# Patient Record
Sex: MALE | NOT HISPANIC OR LATINO | URBAN - METROPOLITAN AREA
[De-identification: names, ages, dates, MRNs, and addresses within clinical notes are randomized per-mention and may not be internally consistent; named-entity substitution may affect disease eponyms.]

---

## 2020-07-19 ENCOUNTER — EMERGENCY (EMERGENCY)
Facility: HOSPITAL | Age: 30
LOS: 1 days | Discharge: HOME | End: 2020-07-19
Admitting: EMERGENCY MEDICINE
Payer: MEDICAID

## 2020-07-19 VITALS
OXYGEN SATURATION: 100 % | SYSTOLIC BLOOD PRESSURE: 120 MMHG | TEMPERATURE: 98 F | RESPIRATION RATE: 16 BRPM | DIASTOLIC BLOOD PRESSURE: 82 MMHG | HEART RATE: 90 BPM

## 2020-07-19 PROCEDURE — 73564 X-RAY EXAM KNEE 4 OR MORE: CPT | Mod: 26,LT

## 2020-07-19 PROCEDURE — 99283 EMERGENCY DEPT VISIT LOW MDM: CPT

## 2020-07-19 RX ORDER — IBUPROFEN 200 MG
600 TABLET ORAL ONCE
Refills: 0 | Status: COMPLETED | OUTPATIENT
Start: 2020-07-19 | End: 2020-07-19

## 2020-07-19 RX ADMIN — Medication 600 MILLIGRAM(S): at 22:50

## 2020-07-19 NOTE — ED PROVIDER NOTE - PHYSICAL EXAMINATION
Vital signs reviewed.   CONSTITUTIONAL: Well-appearing; well-nourished; in no apparent distress. Non-toxic appearing.   HEAD: Normocephalic, atraumatic.  EYES: PERRL, EOM intact, conjunctiva and sclera WNL.  ENT: normal nose; no rhinorrhea;   NECK/LYMPH: Supple; non-tender;  CARD: Normal S1, S2; no murmurs, rubs, or gallops noted.  RESP: Normal chest excursion with respiration; breath sounds clear and equal bilaterally; no wheezes, rhonchi, or rales.  EXT/MS: moves all extremities; distal pulses are normal, no pedal edema. +TTP noted to medial aspect of Lt knee, +swelling noted. No crepitus. Compartments soft. Mild medial laxity. Normal cap refill. FROM of Lt knee.   SKIN: Normal for age and race; warm; dry; good turgor; no apparent lesions or exudate noted. +Swelling noted Lt knee medially.   NEURO: Awake, alert, oriented x 3, no gross deficits, no motor or sensory deficit noted.  PSYCH: Normal mood; appropriate affect.

## 2020-07-19 NOTE — ED PROVIDER NOTE - PROGRESS NOTE DETAILS
KIMBERLY Augusteill- Results of xray +for small effusion, Plan for knee brace and dc home with RICE instructions and ortho follow up. Pt understood and agreed with plan. All questions and concerns addressed. Strict return instructions given.

## 2020-07-19 NOTE — ED PROVIDER NOTE - OBJECTIVE STATEMENT
HPI- Pt is a 30 y.o male with no known PMHx who presents to ED c/o Lt knee pain s/p injury playing basketball. As per patient states that while playing basketball he went up to jump and came down on Lt leg and when he landed states his Lt knee everted and states he felt a pop and fell to ground. Pt states pain mostly to medial aspect of Lt knee. Pain exacerbated with ambulating and states knee feels like its buckling. Pt denies hitting head or loc. Denies cp, sob, weakness, numbness/tingling, back pain, neck pain or any other injuries or complaints. No decreased rom.  +swelling.

## 2020-07-19 NOTE — ED PROVIDER NOTE - NSFOLLOWUPINSTRUCTIONS_ED_ALL_ED_FT
Patient advised to follow up with PRIMARY CARE DOCTOR 1-2 DAYS AND ORTHOPEDIST WITHIN WEEK   and told to return to the emergency department immediately for any new or concerning symptoms such OR ANY OTHER COMPLAINTS. Patient agrees with plan.      -Take motrin or tylenol as needed for pain   -Non-weight bearing as tolerated  -Avoid strenuous activity or sports/gym until follow up with orthopedist      Continue all previously prescribed medications as directed unless otherwise instructed.  Follow up with your primary care physician in 48-72 hours- bring copies of your results.  Return to the ER for worsening or persistent symptoms, and/or ANY NEW OR CONCERNING SYMPTOMS. If you have issues obtaining follow up, please call: [phone] to obtain a doctor or specialist who takes your insurance in your area.  You may call [phone] to make an appointment with the internal medicine clinic.

## 2020-07-19 NOTE — ED PROVIDER NOTE - PATIENT PORTAL LINK FT
You can access the FollowMyHealth Patient Portal offered by [facility] by registering at the following website: [email or web address]. By joining [employer]’s FollowMyHealth portal, you will also be able to view your health information using other applications (apps) compatible with our system.

## 2020-07-30 PROBLEM — Z00.00 ENCOUNTER FOR PREVENTIVE HEALTH EXAMINATION: Status: ACTIVE | Noted: 2020-07-30

## 2020-08-03 ENCOUNTER — APPOINTMENT (OUTPATIENT)
Dept: ORTHOPEDIC SURGERY | Facility: CLINIC | Age: 30
End: 2020-08-03

## 2020-08-03 ENCOUNTER — APPOINTMENT (OUTPATIENT)
Dept: ORTHOPEDIC SURGERY | Facility: CLINIC | Age: 30
End: 2020-08-03
Payer: MEDICAID

## 2020-08-03 VITALS
BODY MASS INDEX: 28.63 KG/M2 | DIASTOLIC BLOOD PRESSURE: 83 MMHG | HEART RATE: 76 BPM | SYSTOLIC BLOOD PRESSURE: 151 MMHG | WEIGHT: 200 LBS | HEIGHT: 70 IN

## 2020-08-03 DIAGNOSIS — M25.562 PAIN IN LEFT KNEE: ICD-10-CM

## 2020-08-03 PROCEDURE — 99203 OFFICE O/P NEW LOW 30 MIN: CPT

## 2020-08-03 NOTE — DISCUSSION/SUMMARY
[de-identified] : This is a 30-year-old male who sustained a left-sided twisting knee injury.  I recommended an MRI of the left knee to better assess the injury and to rule out an ACL versus MCL versus meniscus versus other injury.  In the meantime he may be weightbearing as tolerated with crutches and a [place] locked in extension when ambulating.  He may remove the [place] when in bed.  He should maintain RICE as well.  I have prescribed naproxen for pain control (all side effects were reviewed).  Management will be based on the MRI results.

## 2020-08-03 NOTE — PHYSICAL EXAM
[FreeTextEntry1] : General: well nourished, in no acute distress, alert and oriented to person, place and time.\par Psychiatric: normal mood and affect, no abnormal movements or speech patterns.\par Eyes: vision intact without deficits, sclera and conjunctiva were normal, pupils were equal in size. \par ENT: Ears and nose were normal in appearance. No thyromegaly.\par Lymph: no enlarged nodes, no lymphedema in extremity.\par Respiratory: Normal respiratory rhythm and effort. No wheezing detected without auscultation. No shortness of breath or respiratory distress.\par Cardiac: no cardiac related leg swelling, 2+ peripheral pulses.\par Neurology: normal gross sensation in extremities to light touch.\par Abdomen: soft, non-tender, tympanic, no masses.\par \par LLE:\par \par Skin CDI. Large effusion, w/o ecchymosis. ROM: 0-110 degrees w/ pain. No gross varus/valgus instability, although this is difficult due to pain with manipulation. +medial joint line TTP. No TTP over quadriceps/patellar tendon. Able to actively SLR. No TTP over tibial tubercle or pes insertion. No palpable masses. No lymphedema.\par Questionable [place], difficult to assess secondary to pain.\par Alignment: Neutral.\par EHL/FHL/GS/TA 5/5. S/S/SP/DP/T SILT. Toes warm, BCR. Compartments soft.

## 2020-08-03 NOTE — HISTORY OF PRESENT ILLNESS
[FreeTextEntry1] : This is a previously healthy 30-year-old male who is presenting for evaluation of left-sided knee pain.  This all began 2 weeks ago on 7/19/2020 when he landed from a jump on a planted left foot sustaining a twisting injury while playing basketball.  He subsequently says he heard a pop and had severe pain.  He was then seen at the [place] ER where x-rays were negative for fracture.  He was given a knee immobilizer and sent out for follow-up.  Today he says he continues to have severe pain measuring up to 9 out of 10 in severity localized to the anterior and medial aspect of his left knee.  He has been compliant with the knee immobilizer and has been ambulating with crutches, nonweightbearing.  He has been taking 800 mg ibuprofen alternating with 1000 mg Tylenol for pain relief.  He is currently unemployed.

## 2020-08-10 ENCOUNTER — OUTPATIENT (OUTPATIENT)
Dept: OUTPATIENT SERVICES | Facility: HOSPITAL | Age: 30
LOS: 1 days | End: 2020-08-10
Payer: MEDICAID

## 2020-08-10 ENCOUNTER — APPOINTMENT (OUTPATIENT)
Dept: MRI IMAGING | Facility: CLINIC | Age: 30
End: 2020-08-10
Payer: MEDICAID

## 2020-08-10 DIAGNOSIS — M25.562 PAIN IN LEFT KNEE: ICD-10-CM

## 2020-08-10 PROCEDURE — 73721 MRI JNT OF LWR EXTRE W/O DYE: CPT

## 2020-08-10 PROCEDURE — 73721 MRI JNT OF LWR EXTRE W/O DYE: CPT | Mod: 26,LT

## 2020-08-19 ENCOUNTER — APPOINTMENT (OUTPATIENT)
Dept: ORTHOPEDIC SURGERY | Facility: CLINIC | Age: 30
End: 2020-08-19
Payer: MEDICAID

## 2020-08-19 VITALS
WEIGHT: 200 LBS | HEIGHT: 70 IN | BODY MASS INDEX: 28.63 KG/M2 | SYSTOLIC BLOOD PRESSURE: 145 MMHG | HEART RATE: 66 BPM | TEMPERATURE: 97.8 F | DIASTOLIC BLOOD PRESSURE: 83 MMHG

## 2020-08-19 DIAGNOSIS — S83.512A SPRAIN OF ANTERIOR CRUCIATE LIGAMENT OF LEFT KNEE, INITIAL ENCOUNTER: ICD-10-CM

## 2020-08-19 PROCEDURE — 99214 OFFICE O/P EST MOD 30 MIN: CPT

## 2020-08-31 ENCOUNTER — RX RENEWAL (OUTPATIENT)
Age: 30
End: 2020-08-31

## 2020-08-31 RX ORDER — NAPROXEN 500 MG/1
500 TABLET ORAL
Qty: 60 | Refills: 0 | Status: ACTIVE | COMMUNITY
Start: 2020-08-03 | End: 1900-01-01

## 2020-10-12 ENCOUNTER — APPOINTMENT (OUTPATIENT)
Dept: ORTHOPEDIC SURGERY | Facility: CLINIC | Age: 30
End: 2020-10-12
Payer: MEDICAID

## 2020-10-12 DIAGNOSIS — S83.519D SPRAIN OF ANTERIOR CRUCIATE LIGAMENT OF UNSPECIFIED KNEE, SUBSEQUENT ENCOUNTER: ICD-10-CM

## 2020-10-12 DIAGNOSIS — S83.272D COMPLEX TEAR OF LATERAL MENISCUS, CURRENT INJURY, LEFT KNEE, SUBSEQUENT ENCOUNTER: ICD-10-CM

## 2020-10-12 PROCEDURE — 99213 OFFICE O/P EST LOW 20 MIN: CPT

## 2020-11-04 ENCOUNTER — OUTPATIENT (OUTPATIENT)
Dept: OUTPATIENT SERVICES | Facility: HOSPITAL | Age: 30
LOS: 1 days | End: 2020-11-04

## 2020-11-04 VITALS
RESPIRATION RATE: 16 BRPM | HEART RATE: 60 BPM | SYSTOLIC BLOOD PRESSURE: 108 MMHG | OXYGEN SATURATION: 100 % | DIASTOLIC BLOOD PRESSURE: 60 MMHG | WEIGHT: 205.91 LBS | HEIGHT: 70.5 IN | TEMPERATURE: 99 F

## 2020-11-04 DIAGNOSIS — S83.272A COMPLEX TEAR OF LATERAL MENISCUS, CURRENT INJURY, LEFT KNEE, INITIAL ENCOUNTER: ICD-10-CM

## 2020-11-04 DIAGNOSIS — S83.272D COMPLEX TEAR OF LATERAL MENISCUS, CURRENT INJURY, LEFT KNEE, SUBSEQUENT ENCOUNTER: ICD-10-CM

## 2020-11-04 DIAGNOSIS — Z20.828 CONTACT WITH AND (SUSPECTED) EXPOSURE TO OTHER VIRAL COMMUNICABLE DISEASES: ICD-10-CM

## 2020-11-04 NOTE — H&P PST ADULT - NSICDXPROBLEM_GEN_ALL_CORE_FT
PROBLEM DIAGNOSES  Problem: Complex tear of lateral meniscus of left knee  Assessment and Plan: Written & verbal preop instructions, gi prophylaxis & surgical soap given  Pt verbalized good understanding.  Teach back done on surgical soap instructions.      Problem: Encounter for screening laboratory testing for COVID-19 virus  Assessment and Plan: Per pt have appointment with 72 hrs of this pending surgery       PROBLEM DIAGNOSES  Problem: Complex tear of lateral meniscus of left knee  Assessment and Plan: scheduled for left knee ACL reconstruction with bone patella tendon, bone autograft possible lateral meniscus repair on 11/10/2020   Written & verbal preop instructions, gi prophylaxis & surgical soap given  Pt verbalized good understanding.  Teach back done on surgical soap instructions.      Problem: Encounter for screening laboratory testing for COVID-19 virus  Assessment and Plan: Per pt have appointment with 72 hrs of this pending surgery

## 2020-11-04 NOTE — H&P PST ADULT - NEGATIVE CARDIOVASCULAR SYMPTOMS
no dyspnea on exertion/no paroxysmal nocturnal dyspnea/no peripheral edema/no palpitations/no chest pain/no orthopnea/no claudication

## 2020-11-04 NOTE — H&P PST ADULT - HISTORY OF PRESENT ILLNESS
29y/o male presents for preop eval for scheduled  Per pt had fall while playing basketball on 7/19/2020, evaluated in [facility] ER.  Imaging study and was told "had tears in left ACL and lateral meniscus and instructed to arrange PT and follow up orthopedist".  Had 3/week of PT with some improvement. 31y/o male presents for preop eval for scheduled for left knee ACL reconstruction with bone patella tendon, bone autograft possible lateral meniscus repair on 11/10/2020.  Per pt had fall while playing basketball on 7/19/2020, evaluated in [facility].  Imaging study and was told "had tears in left ACL and lateral meniscus and instructed to arrange PT and follow up orthopedist".  Had 3/week of PT with some improvement.

## 2020-11-07 ENCOUNTER — APPOINTMENT (OUTPATIENT)
Dept: DISASTER EMERGENCY | Facility: CLINIC | Age: 30
End: 2020-11-07

## 2020-11-07 DIAGNOSIS — Z01.818 ENCOUNTER FOR OTHER PREPROCEDURAL EXAMINATION: ICD-10-CM

## 2020-11-08 LAB — SARS-COV-2 N GENE NPH QL NAA+PROBE: NOT DETECTED

## 2020-11-09 ENCOUNTER — TRANSCRIPTION ENCOUNTER (OUTPATIENT)
Age: 30
End: 2020-11-09

## 2020-11-09 VITALS
WEIGHT: 205.91 LBS | SYSTOLIC BLOOD PRESSURE: 120 MMHG | DIASTOLIC BLOOD PRESSURE: 58 MMHG | TEMPERATURE: 98 F | OXYGEN SATURATION: 100 % | HEIGHT: 70.5 IN | HEART RATE: 59 BPM | RESPIRATION RATE: 16 BRPM

## 2020-11-10 ENCOUNTER — APPOINTMENT (OUTPATIENT)
Dept: ORTHOPEDIC SURGERY | Facility: AMBULATORY SURGERY CENTER | Age: 30
End: 2020-11-10

## 2020-11-10 ENCOUNTER — OUTPATIENT (OUTPATIENT)
Dept: OUTPATIENT SERVICES | Facility: HOSPITAL | Age: 30
LOS: 1 days | Discharge: HOME | End: 2020-11-10
Payer: MEDICAID

## 2020-11-10 VITALS
SYSTOLIC BLOOD PRESSURE: 135 MMHG | OXYGEN SATURATION: 100 % | TEMPERATURE: 99 F | RESPIRATION RATE: 18 BRPM | HEART RATE: 69 BPM | DIASTOLIC BLOOD PRESSURE: 89 MMHG

## 2020-11-10 DIAGNOSIS — S83.272D COMPLEX TEAR OF LATERAL MENISCUS, CURRENT INJURY, LEFT KNEE, SUBSEQUENT ENCOUNTER: ICD-10-CM

## 2020-11-10 PROCEDURE — 29888 ARTHRS AID ACL RPR/AGMNTJ: CPT | Mod: LT

## 2020-11-10 RX ORDER — ASPIRIN/CALCIUM CARB/MAGNESIUM 324 MG
1 TABLET ORAL
Qty: 30 | Refills: 0
Start: 2020-11-10 | End: 2020-12-09

## 2020-11-10 NOTE — ASU DISCHARGE PLAN (ADULT/PEDIATRIC) - ASU DC SPECIAL INSTRUCTIONSFT
Partial weight bearing in [place] locked in extension   See sheet Partial weight bearing in [place] locked in extension

## 2020-11-23 ENCOUNTER — APPOINTMENT (OUTPATIENT)
Dept: ORTHOPEDIC SURGERY | Facility: CLINIC | Age: 30
End: 2020-11-23
Payer: MEDICAID

## 2020-11-23 PROCEDURE — 99024 POSTOP FOLLOW-UP VISIT: CPT

## 2021-01-14 PROBLEM — Z98.890 OTHER SPECIFIED POSTPROCEDURAL STATES: Chronic | Status: ACTIVE | Noted: 2020-11-04

## 2021-01-29 ENCOUNTER — APPOINTMENT (OUTPATIENT)
Dept: ORTHOPEDIC SURGERY | Facility: CLINIC | Age: 31
End: 2021-01-29
Payer: MEDICAID

## 2021-01-29 VITALS — TEMPERATURE: 97.2 F

## 2021-01-29 PROCEDURE — 73562 X-RAY EXAM OF KNEE 3: CPT | Mod: LT

## 2021-01-29 PROCEDURE — 99024 POSTOP FOLLOW-UP VISIT: CPT

## 2021-03-01 ENCOUNTER — APPOINTMENT (OUTPATIENT)
Dept: ORTHOPEDIC SURGERY | Facility: CLINIC | Age: 31
End: 2021-03-01
Payer: MEDICAID

## 2021-03-01 DIAGNOSIS — S39.012A STRAIN OF MUSCLE, FASCIA AND TENDON OF LOWER BACK, INITIAL ENCOUNTER: ICD-10-CM

## 2021-03-01 PROCEDURE — 73562 X-RAY EXAM OF KNEE 3: CPT | Mod: LT

## 2021-03-01 PROCEDURE — 99213 OFFICE O/P EST LOW 20 MIN: CPT

## 2021-03-01 PROCEDURE — 72170 X-RAY EXAM OF PELVIS: CPT

## 2021-03-01 PROCEDURE — 72100 X-RAY EXAM L-S SPINE 2/3 VWS: CPT

## 2021-03-01 PROCEDURE — 99072 ADDL SUPL MATRL&STAF TM PHE: CPT

## 2021-03-01 RX ORDER — TIZANIDINE HYDROCHLORIDE 2 MG/1
2 CAPSULE ORAL EVERY 6 HOURS
Qty: 30 | Refills: 0 | Status: ACTIVE | COMMUNITY
Start: 2021-03-01 | End: 1900-01-01

## 2021-03-01 RX ORDER — NAPROXEN 500 MG/1
500 TABLET ORAL
Qty: 40 | Refills: 0 | Status: ACTIVE | COMMUNITY
Start: 2021-03-01 | End: 1900-01-01

## 2021-05-28 ENCOUNTER — APPOINTMENT (OUTPATIENT)
Dept: ORTHOPEDIC SURGERY | Facility: CLINIC | Age: 31
End: 2021-05-28

## 2021-06-21 ENCOUNTER — APPOINTMENT (OUTPATIENT)
Dept: ORTHOPEDIC SURGERY | Facility: CLINIC | Age: 31
End: 2021-06-21
Payer: MEDICAID

## 2021-06-21 DIAGNOSIS — M25.562 PAIN IN LEFT KNEE: ICD-10-CM

## 2021-06-21 PROCEDURE — 99072 ADDL SUPL MATRL&STAF TM PHE: CPT

## 2021-06-21 PROCEDURE — 99213 OFFICE O/P EST LOW 20 MIN: CPT

## 2021-12-06 ENCOUNTER — APPOINTMENT (OUTPATIENT)
Dept: ORTHOPEDIC SURGERY | Facility: CLINIC | Age: 31
End: 2021-12-06
Payer: MEDICAID

## 2021-12-06 DIAGNOSIS — M54.50 LOW BACK PAIN, UNSPECIFIED: ICD-10-CM

## 2021-12-06 DIAGNOSIS — G89.29 LOW BACK PAIN, UNSPECIFIED: ICD-10-CM

## 2021-12-06 PROCEDURE — 99212 OFFICE O/P EST SF 10 MIN: CPT

## 2022-04-12 NOTE — H&P PST ADULT - PRO ARRIVE FROM
home Humira Counseling:  I discussed with the patient the risks of adalimumab including but not limited to myelosuppression, immunosuppression, autoimmune hepatitis, demyelinating diseases, lymphoma, and serious infections.  The patient understands that monitoring is required including a PPD at baseline and must alert us or the primary physician if symptoms of infection or other concerning signs are noted.

## 2022-06-24 NOTE — BRIEF OPERATIVE NOTE - CONDITION POST OP
[employer] Aut[employer]over Pen Needle 30G X 8 MM Misc 100 each 0 5/26/2022     Sig: USE WITH INSULIN INJECTIONS AS DIRECTED      Last ov: 04/05/22-Kandice   Next ov: 07/05/22 Darryl Establish Care     Script refilled      stable

## 2023-04-12 ENCOUNTER — APPOINTMENT (OUTPATIENT)
Dept: ORTHOPEDIC SURGERY | Facility: CLINIC | Age: 33
End: 2023-04-12
Payer: MEDICAID

## 2023-04-12 VITALS
OXYGEN SATURATION: 98 % | SYSTOLIC BLOOD PRESSURE: 121 MMHG | BODY MASS INDEX: 26.48 KG/M2 | DIASTOLIC BLOOD PRESSURE: 71 MMHG | HEIGHT: 70 IN | WEIGHT: 185 LBS | HEART RATE: 62 BPM | TEMPERATURE: 97 F

## 2023-04-12 DIAGNOSIS — Z98.890 OTHER SPECIFIED POSTPROCEDURAL STATES: ICD-10-CM

## 2023-04-12 DIAGNOSIS — M54.42 LUMBAGO WITH SCIATICA, LEFT SIDE: ICD-10-CM

## 2023-04-12 PROCEDURE — 72100 X-RAY EXAM L-S SPINE 2/3 VWS: CPT

## 2023-04-12 PROCEDURE — 72170 X-RAY EXAM OF PELVIS: CPT

## 2023-04-12 PROCEDURE — 73501 X-RAY EXAM HIP UNI 1 VIEW: CPT

## 2023-04-12 PROCEDURE — 99213 OFFICE O/P EST LOW 20 MIN: CPT

## 2023-04-12 RX ORDER — DICLOFENAC SODIUM 75 MG/1
75 TABLET, DELAYED RELEASE ORAL TWICE DAILY
Qty: 40 | Refills: 0 | Status: ACTIVE | COMMUNITY
Start: 2023-04-12 | End: 1900-01-01

## 2023-05-02 ENCOUNTER — APPOINTMENT (OUTPATIENT)
Dept: MRI IMAGING | Facility: CLINIC | Age: 33
End: 2023-05-02
Payer: MEDICAID

## 2023-05-02 ENCOUNTER — RESULT REVIEW (OUTPATIENT)
Age: 33
End: 2023-05-02

## 2023-05-02 ENCOUNTER — OUTPATIENT (OUTPATIENT)
Dept: OUTPATIENT SERVICES | Facility: HOSPITAL | Age: 33
LOS: 1 days | End: 2023-05-02
Payer: MEDICAID

## 2023-05-02 DIAGNOSIS — M54.42 LUMBAGO WITH SCIATICA, LEFT SIDE: ICD-10-CM

## 2023-05-02 PROCEDURE — 72148 MRI LUMBAR SPINE W/O DYE: CPT | Mod: 26

## 2023-05-02 PROCEDURE — 72148 MRI LUMBAR SPINE W/O DYE: CPT

## 2023-05-31 ENCOUNTER — APPOINTMENT (OUTPATIENT)
Dept: ORTHOPEDIC SURGERY | Facility: CLINIC | Age: 33
End: 2023-05-31
Payer: MEDICAID

## 2023-05-31 VITALS
WEIGHT: 185 LBS | BODY MASS INDEX: 26.48 KG/M2 | DIASTOLIC BLOOD PRESSURE: 78 MMHG | HEART RATE: 55 BPM | HEIGHT: 70 IN | OXYGEN SATURATION: 98 % | TEMPERATURE: 97.3 F | SYSTOLIC BLOOD PRESSURE: 145 MMHG

## 2023-05-31 DIAGNOSIS — M54.32 SCIATICA, LEFT SIDE: ICD-10-CM

## 2023-05-31 PROCEDURE — 99213 OFFICE O/P EST LOW 20 MIN: CPT

## 2024-06-17 NOTE — H&P PST ADULT - WEIGHT IN LBS
June 17, 2024     Patient: Markus Elizabeth   YOB: 1980   Date of Visit: 6/17/2024       To Whom it May Concern:    Markus Elizabeth was seen in my clinic on 6/17/2024.     If you have any questions or concerns, please don't hesitate to call.         Sincerely,          Imelda Aranda, DO        CC: No Recipients   [phone]
